# Patient Record
Sex: FEMALE | URBAN - METROPOLITAN AREA
[De-identification: names, ages, dates, MRNs, and addresses within clinical notes are randomized per-mention and may not be internally consistent; named-entity substitution may affect disease eponyms.]

---

## 2018-01-12 ENCOUNTER — LAB REQUISITION (OUTPATIENT)
Dept: LAB | Facility: HOSPITAL | Age: 48
End: 2018-01-12

## 2018-01-12 DIAGNOSIS — Z00.00 ROUTINE GENERAL MEDICAL EXAMINATION AT A HEALTH CARE FACILITY: ICD-10-CM

## 2018-01-12 PROCEDURE — 36415 COLL VENOUS BLD VENIPUNCTURE: CPT

## 2018-04-11 ENCOUNTER — LAB REQUISITION (OUTPATIENT)
Dept: LAB | Facility: HOSPITAL | Age: 48
End: 2018-04-11

## 2018-04-11 DIAGNOSIS — Z00.00 ROUTINE GENERAL MEDICAL EXAMINATION AT A HEALTH CARE FACILITY: ICD-10-CM

## 2018-04-11 PROCEDURE — 36415 COLL VENOUS BLD VENIPUNCTURE: CPT

## 2025-07-16 ENCOUNTER — TELEPHONE (OUTPATIENT)
Dept: FAMILY MEDICINE CLINIC | Facility: CLINIC | Age: 55
End: 2025-07-16
Payer: COMMERCIAL

## 2025-07-17 ENCOUNTER — OFFICE VISIT (OUTPATIENT)
Dept: FAMILY MEDICINE CLINIC | Facility: CLINIC | Age: 55
End: 2025-07-17
Payer: COMMERCIAL

## 2025-07-17 VITALS
BODY MASS INDEX: 29.51 KG/M2 | WEIGHT: 188 LBS | OXYGEN SATURATION: 98 % | HEIGHT: 67 IN | DIASTOLIC BLOOD PRESSURE: 76 MMHG | HEART RATE: 78 BPM | SYSTOLIC BLOOD PRESSURE: 124 MMHG

## 2025-07-17 DIAGNOSIS — Z13.1 SCREENING FOR DIABETES MELLITUS: ICD-10-CM

## 2025-07-17 DIAGNOSIS — E78.2 MIXED HYPERLIPIDEMIA: ICD-10-CM

## 2025-07-17 DIAGNOSIS — I83.91 ASYMPTOMATIC VARICOSE VEINS OF RIGHT LOWER EXTREMITY: ICD-10-CM

## 2025-07-17 DIAGNOSIS — Z82.49 FAMILY HISTORY OF CARDIOVASCULAR DISEASE: ICD-10-CM

## 2025-07-17 DIAGNOSIS — I10 PRIMARY HYPERTENSION: ICD-10-CM

## 2025-07-17 DIAGNOSIS — E89.0 HISTORY OF PARTIAL THYROIDECTOMY: ICD-10-CM

## 2025-07-17 DIAGNOSIS — Z00.00 GENERAL MEDICAL EXAM: Primary | ICD-10-CM

## 2025-07-17 DIAGNOSIS — R00.2 PALPITATIONS: ICD-10-CM

## 2025-07-17 DIAGNOSIS — E55.9 VITAMIN D DEFICIENCY: ICD-10-CM

## 2025-07-17 DIAGNOSIS — Z11.59 NEED FOR HEPATITIS C SCREENING TEST: ICD-10-CM

## 2025-07-17 RX ORDER — MULTIPLE VITAMINS W/ MINERALS TAB 9MG-400MCG
1 TAB ORAL DAILY
COMMUNITY

## 2025-07-17 RX ORDER — ERGOCALCIFEROL 1.25 MG/1
50000 CAPSULE, LIQUID FILLED ORAL WEEKLY
COMMUNITY

## 2025-07-17 RX ORDER — LISINOPRIL AND HYDROCHLOROTHIAZIDE 10; 12.5 MG/1; MG/1
1 TABLET ORAL DAILY
COMMUNITY
Start: 2025-04-25

## 2025-07-17 RX ORDER — PRAVASTATIN SODIUM 40 MG
40 TABLET ORAL DAILY
COMMUNITY
End: 2025-07-21

## 2025-07-17 NOTE — ASSESSMENT & PLAN NOTE
- Advised to monitor for signs of redness, heat, hardness, or stiffness in the veins, which could indicate a more serious condition.  - Recommended use of compression stockings for symptom management.

## 2025-07-17 NOTE — ASSESSMENT & PLAN NOTE
Hypertension is chronic and stable on current therapy.  She will continue medication as previously prescribed.

## 2025-07-17 NOTE — PROGRESS NOTES
Female Physical Note      Date: 2025   Patient Name: Leatha Villalpando  : 1970   MRN: [id number]     Chief Complaint:    Chief Complaint   Patient presents with    [facility]     Wants to discuss some of her varicose veins and menopause symptoms        History of Present Illness: Leatha Villalpando is a 55 y.o. female who is here today for their annual health maintenance and physical.      The patient presents to [facility].    She is seeking a new healthcare provider as her previous one no longer takes her insurance. She has been managing well since her last visit in 2024. She is currently on medication for hypertension and hyperlipidemia, both well-controlled. She did not get her labs done today. She is considering discontinuing her statin as her cholesterol levels have been around 200. She takes her medications in the morning to avoid forgetting them at night. She has a family history of hypertension and hyperlipidemia.    She underwent a partial thyroidectomy in  due to a benign nodule and has had normal thyroid panels since then.     She keeps up with Pap smears and ovarian cancer screenings, with the most recent one in 2025 by Dr. Mari Celestin. She also undergoes annual mammograms, with the last one on 2024. She has dense breast tissue and was advised to get an ultrasound, but her insurance does not cover it. Her gynecologist recommended continuing with mammograms.    She received both doses of the shingles vaccine this year and is up to date with her tetanus shot. She has not received the pneumonia vaccine. She takes vitamin D3 K2 (5000 units) and sleeps better when she takes it.     She walks for exercise regularly, but she has not started weight training yet.    She reports no abdominal pain or changes in bowel movements. She reports no chest pain or shortness of breath. She has some palpitations, which she believes are due to menopause and perimenopause. She had  not had a period for a year, but had one in 02/2025. An ultrasound showed everything was fine. She experiences a pounding sensation during hot flashes at night. She is sensitive to caffeine and has noticed her heart rate can go up to 110 when she consumes it. Her heart rate can reach 130 when she walks a lot. She has not seen a cardiologist.    She reports no ear trouble or hearing issues. She takes Claritin for her sinuses. She reports no sore throats or trouble swallowing. She is up to date with her eye exams. She reports no swelling in her neck or glands. She saw a dermatologist last summer for some spots and has an appointment this fall. She reports no joint issues, dizziness, numbness, or tingling.    She has varicose veins and has not sought treatment for them.       Occupations: Retired pharmacist  Coffee/Tea/Caffeine-containing Drinks: Sensitive to caffeine  Sleep: Reports better sleep when taking vitamin D3 K2    GYNECOLOGICAL HISTORY:  - Last Menstrual Period: 02/2025    PAST SURGICAL HISTORY:  - Partial thyroidectomy in 2008 due to benign nodule  - Cholecystectomy in 01/2024 due to cholelithiasis    FAMILY HISTORY  - Family history of hypertension and hyperlipidemia  - Father had hypertension and congestive heart failure in his 80s  - Grandfather passed away at age 75      Subjective      Review of Systems:   Review of Systems   Constitutional:  Negative for activity change, appetite change, fatigue, fever, unexpected weight gain and unexpected weight loss.   HENT:  Negative for congestion, ear discharge, ear pain, postnasal drip, rhinorrhea, sinus pressure, sneezing, sore throat, trouble swallowing and voice change.    Eyes:  Negative for blurred vision, double vision, photophobia, pain, itching and visual disturbance.   Respiratory:  Negative for apnea, cough, chest tightness, shortness of breath and wheezing.    Cardiovascular:  Positive for palpitations. Negative for chest pain and leg swelling.    Gastrointestinal:  Negative for abdominal pain, blood in stool, constipation, diarrhea, nausea, vomiting and GERD.   Endocrine: Negative for cold intolerance, heat intolerance, polydipsia and polyuria.   Genitourinary:  Negative for decreased urine volume, difficulty urinating, dysuria, flank pain, frequency, hematuria and urinary incontinence.   Musculoskeletal:  Negative for arthralgias, back pain, gait problem, joint swelling and myalgias.   Skin:  Negative for rash, skin lesions and wound.   Allergic/Immunologic: Positive for environmental allergies. Negative for food allergies.   Neurological:  Negative for dizziness, syncope, weakness, light-headedness, numbness and headache.   Hematological:  Negative for adenopathy. Does not bruise/bleed easily.   Psychiatric/Behavioral:  Negative for decreased concentration, dysphoric mood, sleep disturbance, depressed mood and stress. The patient is not nervous/anxious.        Past Medical History, Social History, Family History and Care Team were all reviewed with patient and updated as appropriate.     Medications:     Current Outpatient Medications:     lisinopril-hydrochlorothiazide (PRINZIDE,ZESTORETIC) 10-12.5 MG per tablet, Take 1 tablet by mouth Daily., Disp: , Rfl:     LORATADINE PO, Take 10 mg by mouth Daily., Disp: , Rfl:     multivitamin with minerals tablet tablet, Take 1 tablet by mouth Daily., Disp: , Rfl:     pravastatin (PRAVACHOL) 40 MG tablet, Take 1 tablet by mouth Daily., Disp: , Rfl:     vitamin D (ERGOCALCIFEROL) 1.25 MG (71718 UT) capsule capsule, Take 1 capsule by mouth 1 (One) Time Per Week., Disp: , Rfl:     Allergies:   No Known Allergies    Health Maintenance   Topic Date Due    LIPID PANEL  Never done    TDAP/TD VACCINES (1 - Tdap) Never done    Pneumococcal Vaccine 50+ (1 of 1 - PCV) Never done    HEPATITIS C SCREENING  Never done    ANNUAL PHYSICAL  Never done    COVID-19 Vaccine (3 - 2024-25 season) 07/31/2025 (Originally 9/1/2024)     "ZOSTER VACCINE (1 of 2) 07/31/2025 (Originally 6/3/2018)    INFLUENZA VACCINE  10/01/2025    MAMMOGRAM  08/01/2026    PAP SMEAR  06/12/2028    COLORECTAL CANCER SCREENING  05/05/2035     Objective     Physical Exam:  Vital Signs:   Vitals:    07/17/25 0917   BP: 124/76   BP Location: Left arm   Patient Position: Sitting   Cuff Size: Adult   Pulse: 78   SpO2: 98%   Weight: 85.3 kg (188 lb)   Height: 170.2 cm (67\")     Body mass index is 29.44 kg/m².     Physical Exam  Vitals and nursing note reviewed.   Constitutional:       General: She is not in acute distress.     Appearance: Normal appearance. She is not ill-appearing.   HENT:      Head: Normocephalic and atraumatic.      Right Ear: Tympanic membrane, ear canal and external ear normal.      Left Ear: Tympanic membrane, ear canal and external ear normal.      Nose: Nose normal.      Mouth/Throat:      Mouth: Mucous membranes are moist.      Pharynx: Oropharynx is clear. No oropharyngeal exudate or posterior oropharyngeal erythema.   Eyes:      Extraocular Movements: Extraocular movements intact.      Conjunctiva/sclera: Conjunctivae normal.      Pupils: Pupils are equal, round, and reactive to light.   Cardiovascular:      Rate and Rhythm: Normal rate and regular rhythm.      Heart sounds: Normal heart sounds.      Comments: Prominent varicose vein on right lower extremity, no erythema or induration  Pulmonary:      Effort: Pulmonary effort is normal.      Breath sounds: Normal breath sounds.   Abdominal:      General: Bowel sounds are normal.      Palpations: Abdomen is soft. There is no mass.      Tenderness: There is no abdominal tenderness. There is no right CVA tenderness, left CVA tenderness or guarding.      Hernia: No hernia is present.   Musculoskeletal:      Cervical back: Normal range of motion and neck supple.      Right lower leg: No edema.      Left lower leg: No edema.   Lymphadenopathy:      Cervical: No cervical adenopathy.   Skin:     General: " Skin is warm.   Neurological:      General: No focal deficit present.      Mental Status: She is alert and oriented to person, place, and time.      Motor: No weakness.      Coordination: Coordination normal.      Gait: Gait normal.   Psychiatric:         Mood and Affect: Mood normal.         Behavior: Behavior normal.         Assessment / Plan        Assessment/Plan:   Diagnoses and all orders for this visit:    1. General medical exam (Primary)  Comments:  Benefits of immunizations and preventative screenings discussed with the patient and encouraged.    2. Primary hypertension  Assessment & Plan:  Hypertension is chronic and stable on current therapy.  She will continue medication as previously prescribed.    Orders:  -     CBC & Differential  -     Comprehensive Metabolic Panel    3. Palpitations  Assessment & Plan:  - Palpitations may be hormonal and caffeine induced.  -She does have a family history of heart disease, so we will schedule with cardiology for evaluation.  She prefers to do this as well.    Orders:  -     Ambulatory Referral to Cardiology for Other; Palpitations    4. Mixed hyperlipidemia  Assessment & Plan:  Hyperlipidemia is chronic per patient history.  Level will be checked on labs, and they will continue current medication pending results.    Orders:  -     CBC & Differential  -     Comprehensive Metabolic Panel  -     Lipid Panel    5. Vitamin D deficiency  Assessment & Plan:  - Levels have been low in the past.  We will recheck on today's labs.  -She is currently taking vitamin D3 K2 5000 units daily.    Orders:  -     Vitamin D,25-Hydroxy    6. Asymptomatic varicose veins of right lower extremity  Assessment & Plan:  - Advised to monitor for signs of redness, heat, hardness, or stiffness in the veins, which could indicate a more serious condition.  - Recommended use of compression stockings for symptom management.      7. History of partial thyroidectomy  Assessment & Plan:  - Partial  thyroidectomy in 2008 due to benign nodule; normal thyroid panels since then.    Orders:  -     Thyroid [place] Profile    8. Family history of cardiovascular disease  -     Ambulatory Referral to Cardiology for Other; Palpitations    9. Need for hepatitis C screening test  -     HCV Antibody Rfx To Qnt PCR    10. Screening for diabetes mellitus  -     Hemoglobin A1c        Follow Up:   Return in about 1 year (around 7/17/2026) for Annual Physical.    Healthcare Maintenance:   Discussed injury prevention, diet and exercise, safe sexual practices, and screening for common diseases. Encouraged use of sunscreen and seatbelts. Encouraged SBE, avoidance of tobacco, limiting alcohol, and yearly dental and eye exams.   Leatha Villalpando voices understanding and acceptance of this advice and will call back with any further questions or concerns. AVS with preventive healthcare tips printed for patient.     Cuca Lynne PA-C  [facility]

## 2025-07-17 NOTE — ASSESSMENT & PLAN NOTE
- Levels have been low in the past.  We will recheck on today's labs.  -She is currently taking vitamin D3 K2 5000 units daily.

## 2025-07-17 NOTE — ASSESSMENT & PLAN NOTE
- Palpitations may be hormonal and caffeine induced.  -She does have a family history of heart disease, so we will schedule with cardiology for evaluation.  She prefers to do this as well.

## 2025-07-18 LAB
25(OH)D3+25(OH)D2 SERPL-MCNC: 86.7 NG/ML (ref 30–100)
ALBUMIN SERPL-MCNC: 4.6 G/DL (ref 3.8–4.9)
ALP SERPL-CCNC: 79 IU/L (ref 44–121)
ALT SERPL-CCNC: 24 IU/L (ref 0–32)
AST SERPL-CCNC: 23 IU/L (ref 0–40)
BASOPHILS # BLD AUTO: 0 X10E3/UL (ref 0–0.2)
BASOPHILS NFR BLD AUTO: 1 %
BILIRUB SERPL-MCNC: 0.3 MG/DL (ref 0–1.2)
BUN SERPL-MCNC: 18 MG/DL (ref 6–24)
BUN/CREAT SERPL: 27 (ref 9–23)
CALCIUM SERPL-MCNC: 9.3 MG/DL (ref 8.7–10.2)
CHLORIDE SERPL-SCNC: 102 MMOL/L (ref 96–106)
CHOLEST SERPL-MCNC: 217 MG/DL (ref 100–199)
CO2 SERPL-SCNC: 21 MMOL/L (ref 20–29)
CREAT SERPL-MCNC: 0.66 MG/DL (ref 0.57–1)
EGFRCR SERPLBLD CKD-EPI 2021: 102 ML/MIN/1.73
EOSINOPHIL # BLD AUTO: 0.3 X10E3/UL (ref 0–0.4)
EOSINOPHIL NFR BLD AUTO: 7 %
ERYTHROCYTE [DISTWIDTH] IN BLOOD BY AUTOMATED COUNT: 12.9 % (ref 11.7–15.4)
GLOBULIN SER CALC-MCNC: 2.7 G/DL (ref 1.5–4.5)
GLUCOSE SERPL-MCNC: 108 MG/DL (ref 70–99)
HBA1C MFR BLD: 5.8 % (ref 4.8–5.6)
HCT VFR BLD AUTO: 40.3 % (ref 34–46.6)
HCV AB SERPL QL IA: NON REACTIVE
HCV AB SERPL QL IA: NORMAL
HDLC SERPL-MCNC: 42 MG/DL
HGB BLD-MCNC: 12.9 G/DL (ref 11.1–15.9)
IMM GRANULOCYTES # BLD AUTO: 0 X10E3/UL (ref 0–0.1)
IMM GRANULOCYTES NFR BLD AUTO: 0 %
LDLC SERPL CALC-MCNC: 134 MG/DL (ref 0–99)
LYMPHOCYTES # BLD AUTO: 1.5 X10E3/UL (ref 0.7–3.1)
LYMPHOCYTES NFR BLD AUTO: 37 %
MCH RBC QN AUTO: 29.9 PG (ref 26.6–33)
MCHC RBC AUTO-ENTMCNC: 32 G/DL (ref 31.5–35.7)
MCV RBC AUTO: 93 FL (ref 79–97)
MONOCYTES # BLD AUTO: 0.3 X10E3/UL (ref 0.1–0.9)
MONOCYTES NFR BLD AUTO: 8 %
NEUTROPHILS # BLD AUTO: 2 X10E3/UL (ref 1.4–7)
NEUTROPHILS NFR BLD AUTO: 47 %
PLATELET # BLD AUTO: 335 X10E3/UL (ref 150–450)
POTASSIUM SERPL-SCNC: 4.2 MMOL/L (ref 3.5–5.2)
PROT SERPL-MCNC: 7.3 G/DL (ref 6–8.5)
RBC # BLD AUTO: 4.32 X10E6/UL (ref 3.77–5.28)
SODIUM SERPL-SCNC: 140 MMOL/L (ref 134–144)
TRIGL SERPL-MCNC: 228 MG/DL (ref 0–149)
TSH SERPL DL<=0.005 MIU/L-ACNC: 1.97 UIU/ML (ref 0.45–4.5)
VLDLC SERPL CALC-MCNC: 41 MG/DL (ref 5–40)
WBC # BLD AUTO: 4.1 X10E3/UL (ref 3.4–10.8)

## 2025-07-21 ENCOUNTER — PATIENT ROUNDING (BHMG ONLY) (OUTPATIENT)
Dept: FAMILY MEDICINE CLINIC | Facility: CLINIC | Age: 55
End: 2025-07-21
Payer: COMMERCIAL

## 2025-07-21 NOTE — PROGRESS NOTES
July 21, 2025    Hello, may I speak with Leatha Villalpando?    My name is ANKITA      I am  with [facility]  [facility] [address] [place] [address] [phone].    Before we get started may I verify your date of birth? 1970    I am calling to officially welcome you to our practice and ask about your recent visit. Is this a good time to talk? yes    Tell me about your visit with us. What things went well?  EVERYTHING WENT WELL, ANSWERED ALL MY QUESTIONS       We're always looking for ways to make our patients' experiences even better. Do you have recommendations on ways we may improve?  no SMOOTH;Y    Overall were you satisfied with your first visit to our practice? yes       I appreciate you taking the time to speak with me today. Is there anything else I can do for you? no      Thank you, and have a great day.

## 2025-07-31 ENCOUNTER — OFFICE VISIT (OUTPATIENT)
Dept: CARDIOLOGY | Facility: CLINIC | Age: 55
End: 2025-07-31
Payer: COMMERCIAL

## 2025-07-31 VITALS
OXYGEN SATURATION: 98 % | HEIGHT: 67 IN | BODY MASS INDEX: 29.51 KG/M2 | HEART RATE: 92 BPM | WEIGHT: 188 LBS | DIASTOLIC BLOOD PRESSURE: 86 MMHG | SYSTOLIC BLOOD PRESSURE: 126 MMHG | RESPIRATION RATE: 17 BRPM

## 2025-07-31 DIAGNOSIS — I10 PRIMARY HYPERTENSION: Primary | ICD-10-CM

## 2025-07-31 DIAGNOSIS — E78.2 MIXED HYPERLIPIDEMIA: ICD-10-CM

## 2025-07-31 DIAGNOSIS — Z82.49 FAMILY HISTORY OF CHF (CONGESTIVE HEART FAILURE): ICD-10-CM

## 2025-07-31 DIAGNOSIS — R00.2 PALPITATIONS: ICD-10-CM

## 2025-07-31 LAB
MAXIMAL PREDICTED HEART RATE: 163 BPM
STRESS TARGET HR: 139 BPM

## 2025-07-31 PROCEDURE — 99204 OFFICE O/P NEW MOD 45 MIN: CPT | Performed by: INTERNAL MEDICINE

## 2025-07-31 PROCEDURE — 93000 ELECTROCARDIOGRAM COMPLETE: CPT | Performed by: INTERNAL MEDICINE

## 2025-07-31 NOTE — ASSESSMENT & PLAN NOTE
Hypertension is borderline controlled diastolic in office.  Continue current treatment regimen.  Dietary sodium restriction.  Weight loss.  Regular aerobic exercise.  Ambulatory blood pressure monitoring.  Blood pressure will be reassessed in 6 months.

## 2025-07-31 NOTE — PROGRESS NOTES
[facility]  [facility]  [address] [place] [address]  Dept: [phone]  Dept Fax: [phone]    Date: 07/31/2025  Patient: Leatha Villalpando  YOB: 1970    New Patient Office Note    Consult Reason:  Ms. Leatha Villalpando is a 55 y.o. female who presents to the clinic to [facility] care, seen for Palpitations and Rapid Heart Rate.     Subjective    History of Present Illness  The patient presents for evaluation of palpitations.    She reports experiencing heart palpitations, which have led to a decrease in her exercise routine due to fear. These palpitations typically occur during periods of rest and last only a few seconds. The frequency of these episodes varies from week to week. She also experiences hot flashes during these episodes. She does not experience any chest pain, dizziness, lightheadedness, or shortness of breath during these episodes. Occasionally, she feels discomfort in her neck. She has reduced her caffeine intake to one cup of coffee per day as she believes it exacerbates her symptoms. She does not experience any chest pain during physical exertion. She has never undergone a stress test.    She has been on blood pressure medication since 2013 without any issues. She has been on pravastatin for approximately 3 years, but her cholesterol levels remained around 200. Recently, her cholesterol level increased to 217, prompting a switch to rosuvastatin 20 mg about a week ago. She occasionally notices slight swelling in her feet at night, particularly when wearing socks.    SOCIAL HISTORY  Exercise: Reports being fearful to exercise as much due to heart palpitations.  Coffee/Tea/Caffeine-containing Drinks: Has cut down on caffeine intake and currently consumes maybe one cup of coffee a day.    FAMILY HISTORY  - Father: Congestive heart failure and atrial fibrillation, diagnosed in his 80s  - Mother: No history of heart disease    Negative  for blockages, stents, or bypass surgery in the father      Past Medical History:   Diagnosis Date    Hyperlipidemia 2018    Hypertension 2013       Past Surgical History:   Procedure Laterality Date    CHOLECYSTECTOMY  2/2024    THYROIDECTOMY      partial, benign nodule       Family History   Problem Relation Age of Onset    Arthritis Mother     Cancer Mother         Ovarian    Diabetes Father     Heart disease Father         CHF    Hypertension Father     Cancer Maternal Grandmother         Pancreatic Cancer at age 92    Heart disease Paternal Grandfather         MI    Hypertension Paternal Grandfather     Diabetes Paternal Grandmother     Cancer Maternal Aunt         Breast Cancer    Cancer Maternal Aunt         Breast Cancer    Diabetes Brother     Hypertension Brother         Social History     Socioeconomic History    Marital status:    Tobacco Use    Smoking status: Never     Passive exposure: Never    Smokeless tobacco: Never   Vaping Use    Vaping status: Never Used   Substance and Sexual Activity    Alcohol use: Not Currently    Drug use: Never    Sexual activity: Yes     Partners: Male     Birth control/protection: Post-menopausal, Vasectomy       The following portions of the patient's history were reviewed and updated as appropriate: allergies, current medications, past family history, past medical history, past social history, past surgical history, and problem list.    Medications: No Known Allergies   Current Outpatient Medications   Medication Instructions    lisinopril-hydrochlorothiazide (PRINZIDE,ZESTORETIC) 10-12.5 MG per tablet 1 tablet, Daily    LORATADINE PO 10 mg, Daily    multivitamin with minerals tablet tablet 1 tablet, Daily    rosuvastatin (CRESTOR) 20 mg, Oral, Daily    vitamin D3 5,000 Units, Daily       Objective  Vitals:    07/31/25 1044   BP: 126/86   BP Location: Right arm   Patient Position: Sitting   Cuff Size: Adult   Pulse: 92   Resp: 17   SpO2: 98%   Weight: 85.3 kg  "(188 lb)   Height: 170.2 cm (67.01\")   PainSc: 0-No pain     Vitals:    07/31/25 1044   BP: 126/86   BP Location: Right arm   Patient Position: Sitting   Cuff Size: Adult   Pulse: 92   Resp: 17   SpO2: 98%   Weight: 85.3 kg (188 lb)   Height: 170.2 cm (67.01\")        Physical Exam  Constitutional:       Appearance: Healthy appearance. Not in distress.   Eyes:      Pupils: Pupils are equal, round, and reactive to light.   HENT:    Mouth/Throat:      Mouth: Mucous membranes are moist.   Neck:      Vascular: No carotid bruit, hepatojugular reflux, JVD or JVR. JVD normal.   Pulmonary:      Effort: Pulmonary effort is normal.      Breath sounds: Normal breath sounds. No wheezing. No rhonchi. No rales.   Chest:      Chest wall: Not tender to palpatation.   Cardiovascular:      PMI at left midclavicular line. Normal rate. Regular rhythm. Normal S1 with normal intensity. Normal S2 with normal intensity.       Murmurs: There is no murmur.      No gallop.  No click. No rub.   Pulses:     Intact distal pulses.   Edema:     Peripheral edema absent.   Abdominal:      General: There is no abdominal bruit.   Skin:     General: Skin is warm.   Neurological:      Mental Status: Alert and oriented to person, place and time.              Labs:  Lab Results   Component Value Date     07/17/2025    K 4.2 07/17/2025     07/17/2025    CO2 21 07/17/2025    BUN 18 07/17/2025    CREATININE 0.66 07/17/2025    CALCIUM 9.3 07/17/2025    BILITOT 0.3 07/17/2025    ALKPHOS 79 07/17/2025    ALT 24 07/17/2025    AST 23 07/17/2025    GLUCOSE 108 (H) 07/17/2025    ALBUMIN 4.6 07/17/2025     Lab Results   Component Value Date    WBC 4.1 07/17/2025    HGB 12.9 07/17/2025    HCT 40.3 07/17/2025     07/17/2025     No results found for: \"APTT\", \"INR\", \"PTT\"  No results found for: \"CKTOTAL\", \"TROPONINI\", \"TROPONINT\", \"CKMBINDEX\", \"BNP\"  No results found for: \"BNP\", \"PROBNP\"    Lab Results   Component Value Date    CHLPL 217 (H) 07/17/2025 "    TRIG 228 (H) 07/17/2025    HDL 42 07/17/2025     (H) 07/17/2025     Lab Results   Component Value Date    TSH 1.970 07/17/2025       The 10-year ASCVD risk score (Mihai CAMPUZANO, et al., 2019) is: 4.3%    Values used to calculate the score:      Age: 55 years      Sex: Female      Is Non- : No      Diabetic: No      Tobacco smoker: No      Systolic Blood Pressure: 126 mmHg      Is BP treated: Yes      HDL Cholesterol: 42 mg/dL      Total Cholesterol: 217 mg/dL     CV Diagnostics:    ECG 12 Lead    Date/Time: 7/31/2025 11:21 AM  Performed by: Dalton Noriega MD    Authorized by: Dalton Noriega MD  Comparison: not compared with previous ECG   Previous ECG: no previous ECG available  Rhythm: sinus rhythm    Clinical impression: normal ECG  Comments: Normal sinus rhythm        CXR: No results found for this or any previous visit.     ECHO/MUGA: No results found for this or any previous visit.     STRESS TESTS: No results found for this or any previous visit.     CARDIAC CATH: No results found for this or any previous visit.     DEVICES: No valid procedures specified.   HOLTER: No results found for this or any previous visit.     CT/MRI:  No results found for this or any previous visit.    VASCULAR: No valid procedures specified.     Assessment and Plan  Diagnoses and all orders for this visit:    1. Primary hypertension (Primary)  Assessment & Plan:  Hypertension is borderline controlled diastolic in office.  Continue current treatment regimen.  Dietary sodium restriction.  Weight loss.  Regular aerobic exercise.  Ambulatory blood pressure monitoring.  Blood pressure will be reassessed in 6 months.    Orders:  -     Adult Transthoracic Echo Complete W/ Cont if Necessary Per Protocol; Future    2. Mixed hyperlipidemia  Assessment & Plan:   Lipid abnormalities are newly identified    Plan:  Continue same medication/s without change.  Rosuvastatin 20 mg p.o. daily, started a week  ago    Discussed medication dosage, use, side effects, and goals of treatment in detail.    Counseled patient on lifestyle modifications to help control hyperlipidemia.   Cholesterol lowering dietary information shared with patient.  Advised patient to exercise for 150 minutes weekly. (30 minute brisk walk, 5 days a week for example)  Weight Loss encouraged    Lab Results   Component Value Date    CHLPL 217 (H) 07/17/2025    TRIG 228 (H) 07/17/2025    HDL 42 07/17/2025     (H) 07/17/2025     Goal of therapy: LDL  mg/dL      Followup in 6 months.      3. Palpitations  Assessment & Plan:  Infrequent palpitations lasting seconds.  No association with chest pain/shortness of breath/lightheadedness.  Holter monitor 48-hour for characterization.  Family history of CHF in father, no clear etiology, plan for a transthoracic echocardiogram in view of hypertension.    Orders:  -     Adult Transthoracic Echo Complete W/ Cont if Necessary Per Protocol; Future  -     Holter Monitor - 48 Hour  -     ECG 12 Lead    4. Family history of CHF (congestive heart failure)  -     Adult Transthoracic Echo Complete W/ Cont if Necessary Per Protocol; Future         Return in about 6 months (around 1/31/2026) for Follow-up with Dr Noriega.    There are no Patient Instructions on file for this visit.    Patient or patient representative verbalized consent for the use of Ambient Listening during the visit with  Dalton Noriega MD for chart documentation. 7/31/2025  11:24 EDT    Dalton Noriega MD

## 2025-07-31 NOTE — ASSESSMENT & PLAN NOTE
Lipid abnormalities are newly identified    Plan:  Continue same medication/s without change.  Rosuvastatin 20 mg p.o. daily, started a week ago    Discussed medication dosage, use, side effects, and goals of treatment in detail.    Counseled patient on lifestyle modifications to help control hyperlipidemia.   Cholesterol lowering dietary information shared with patient.  Advised patient to exercise for 150 minutes weekly. (30 minute brisk walk, 5 days a week for example)  Weight Loss encouraged    Lab Results   Component Value Date    CHLPL 217 (H) 07/17/2025    TRIG 228 (H) 07/17/2025    HDL 42 07/17/2025     (H) 07/17/2025     Goal of therapy: LDL  mg/dL      Followup in 6 months.

## 2025-07-31 NOTE — ASSESSMENT & PLAN NOTE
Infrequent palpitations lasting seconds.  No association with chest pain/shortness of breath/lightheadedness.  Holter monitor 48-hour for characterization.  Family history of CHF in father, no clear etiology, plan for a transthoracic echocardiogram in view of hypertension.

## 2025-08-01 ENCOUNTER — PATIENT ROUNDING (BHMG ONLY) (OUTPATIENT)
Dept: CARDIOLOGY | Facility: CLINIC | Age: 55
End: 2025-08-01
Payer: COMMERCIAL

## 2025-08-01 NOTE — PROGRESS NOTES
A [employer] message has been sent to the patient for PATIENT ROUNDING with [facility].

## 2025-08-10 ENCOUNTER — RESULTS FOLLOW-UP (OUTPATIENT)
Dept: CARDIOLOGY | Facility: CLINIC | Age: 55
End: 2025-08-10
Payer: COMMERCIAL

## 2025-08-10 LAB
CV ZIO BASELINE AVG BPM: 89
CV ZIO BASELINE BPM HIGH: 131
CV ZIO BASELINE BPM LOW: 58
MAXIMAL PREDICTED HEART RATE: 163 BPM
STRESS TARGET HR: 139 BPM

## 2025-08-11 RX ORDER — METOPROLOL SUCCINATE 25 MG/1
25 TABLET, EXTENDED RELEASE ORAL DAILY
Qty: 30 TABLET | Refills: 5 | Status: SHIPPED | OUTPATIENT
Start: 2025-08-11